# Patient Record
Sex: MALE | Race: WHITE | NOT HISPANIC OR LATINO | Employment: STUDENT | URBAN - METROPOLITAN AREA
[De-identification: names, ages, dates, MRNs, and addresses within clinical notes are randomized per-mention and may not be internally consistent; named-entity substitution may affect disease eponyms.]

---

## 2019-01-03 ENCOUNTER — LAB VISIT (OUTPATIENT)
Dept: LAB | Facility: HOSPITAL | Age: 19
End: 2019-01-03
Attending: INTERNAL MEDICINE
Payer: COMMERCIAL

## 2019-01-03 ENCOUNTER — OFFICE VISIT (OUTPATIENT)
Dept: PRIMARY CARE CLINIC | Facility: CLINIC | Age: 19
End: 2019-01-03
Payer: COMMERCIAL

## 2019-01-03 ENCOUNTER — PATIENT MESSAGE (OUTPATIENT)
Dept: PRIMARY CARE CLINIC | Facility: CLINIC | Age: 19
End: 2019-01-03

## 2019-01-03 VITALS
BODY MASS INDEX: 18.63 KG/M2 | WEIGHT: 133.06 LBS | SYSTOLIC BLOOD PRESSURE: 123 MMHG | HEIGHT: 71 IN | HEART RATE: 90 BPM | DIASTOLIC BLOOD PRESSURE: 73 MMHG | OXYGEN SATURATION: 99 %

## 2019-01-03 DIAGNOSIS — Z00.00 ANNUAL PHYSICAL EXAM: ICD-10-CM

## 2019-01-03 DIAGNOSIS — Z00.00 ANNUAL PHYSICAL EXAM: Primary | ICD-10-CM

## 2019-01-03 DIAGNOSIS — G47.9 SLEEP DISORDER: ICD-10-CM

## 2019-01-03 LAB
25(OH)D3+25(OH)D2 SERPL-MCNC: 28 NG/ML
ALBUMIN SERPL BCP-MCNC: 4.5 G/DL
ALP SERPL-CCNC: 67 U/L
ALT SERPL W/O P-5'-P-CCNC: 11 U/L
ANION GAP SERPL CALC-SCNC: 9 MMOL/L
AST SERPL-CCNC: 17 U/L
BASOPHILS # BLD AUTO: 0.03 K/UL
BASOPHILS NFR BLD: 0.5 %
BILIRUB SERPL-MCNC: 0.4 MG/DL
BUN SERPL-MCNC: 17 MG/DL
CALCIUM SERPL-MCNC: 10.2 MG/DL
CHLORIDE SERPL-SCNC: 104 MMOL/L
CHOLEST SERPL-MCNC: 192 MG/DL
CHOLEST/HDLC SERPL: 3.9 {RATIO}
CO2 SERPL-SCNC: 27 MMOL/L
CREAT SERPL-MCNC: 1.1 MG/DL
DIFFERENTIAL METHOD: NORMAL
EOSINOPHIL # BLD AUTO: 0.2 K/UL
EOSINOPHIL NFR BLD: 2.6 %
ERYTHROCYTE [DISTWIDTH] IN BLOOD BY AUTOMATED COUNT: 12.7 %
EST. GFR  (AFRICAN AMERICAN): >60 ML/MIN/1.73 M^2
EST. GFR  (NON AFRICAN AMERICAN): >60 ML/MIN/1.73 M^2
GLUCOSE SERPL-MCNC: 85 MG/DL
HCT VFR BLD AUTO: 45.8 %
HDLC SERPL-MCNC: 49 MG/DL
HDLC SERPL: 25.5 %
HGB BLD-MCNC: 14.9 G/DL
IMM GRANULOCYTES # BLD AUTO: 0.01 K/UL
IMM GRANULOCYTES NFR BLD AUTO: 0.2 %
LDLC SERPL CALC-MCNC: 109.4 MG/DL
LYMPHOCYTES # BLD AUTO: 1.4 K/UL
LYMPHOCYTES NFR BLD: 23.4 %
MCH RBC QN AUTO: 30.7 PG
MCHC RBC AUTO-ENTMCNC: 32.5 G/DL
MCV RBC AUTO: 94 FL
MONOCYTES # BLD AUTO: 0.5 K/UL
MONOCYTES NFR BLD: 8.3 %
NEUTROPHILS # BLD AUTO: 3.8 K/UL
NEUTROPHILS NFR BLD: 65 %
NONHDLC SERPL-MCNC: 143 MG/DL
NRBC BLD-RTO: 0 /100 WBC
PLATELET # BLD AUTO: 185 K/UL
PMV BLD AUTO: 11 FL
POTASSIUM SERPL-SCNC: 4.7 MMOL/L
PROT SERPL-MCNC: 8.3 G/DL
RBC # BLD AUTO: 4.86 M/UL
SODIUM SERPL-SCNC: 140 MMOL/L
TRIGL SERPL-MCNC: 168 MG/DL
TSH SERPL DL<=0.005 MIU/L-ACNC: 1.9 UIU/ML
WBC # BLD AUTO: 5.81 K/UL

## 2019-01-03 PROCEDURE — 80053 COMPREHEN METABOLIC PANEL: CPT

## 2019-01-03 PROCEDURE — 99203 PR OFFICE/OUTPT VISIT, NEW, LEVL III, 30-44 MIN: ICD-10-PCS | Mod: S$GLB,,, | Performed by: INTERNAL MEDICINE

## 2019-01-03 PROCEDURE — 99999 PR PBB SHADOW E&M-NEW PATIENT-LVL III: ICD-10-PCS | Mod: PBBFAC,,, | Performed by: INTERNAL MEDICINE

## 2019-01-03 PROCEDURE — 36415 COLL VENOUS BLD VENIPUNCTURE: CPT | Mod: PN

## 2019-01-03 PROCEDURE — 84443 ASSAY THYROID STIM HORMONE: CPT

## 2019-01-03 PROCEDURE — 82306 VITAMIN D 25 HYDROXY: CPT

## 2019-01-03 PROCEDURE — 99203 OFFICE O/P NEW LOW 30 MIN: CPT | Mod: S$GLB,,, | Performed by: INTERNAL MEDICINE

## 2019-01-03 PROCEDURE — 80061 LIPID PANEL: CPT

## 2019-01-03 PROCEDURE — 85025 COMPLETE CBC W/AUTO DIFF WBC: CPT

## 2019-01-03 PROCEDURE — 99999 PR PBB SHADOW E&M-NEW PATIENT-LVL III: CPT | Mod: PBBFAC,,, | Performed by: INTERNAL MEDICINE

## 2019-01-03 NOTE — PROGRESS NOTES
Subjective:       Patient ID: Caleb Mckenzie is a 18 y.o. male with no significant PMH who presents today to establish care.    Chief Complaint: Establish Care    HPI   Patient back from college and his mother wants his vitamin D checked as he is sleeping a lot.  Sleeping about 10 hours while at home.  At schedule 7-8 hours a night.  Patient denies f/c, n/v/d.  No chest pain or SOB.  No abdominal pain or dysuria.  No headaches or change in vision.  No dizziness.  No significant  weight gain or weight loss.  Remaining ROS negative.    Review of Systems   Constitutional: Positive for activity change. Negative for appetite change, diaphoresis, fatigue and unexpected weight change.   HENT: Negative for congestion, ear pain, hearing loss, rhinorrhea, sinus pressure, sore throat, trouble swallowing and voice change.    Eyes: Negative for photophobia, pain, discharge and visual disturbance.   Respiratory: Negative for cough, chest tightness, shortness of breath and wheezing.    Cardiovascular: Negative for chest pain, palpitations and leg swelling.   Gastrointestinal: Negative for abdominal pain, blood in stool, constipation, diarrhea, nausea and vomiting.   Endocrine: Negative for cold intolerance, heat intolerance, polydipsia, polyphagia and polyuria.   Genitourinary: Negative for decreased urine volume, difficulty urinating, discharge, dysuria, flank pain, hematuria, scrotal swelling, testicular pain and urgency.   Musculoskeletal: Negative for arthralgias, back pain, joint swelling, myalgias and neck pain.   Skin: Negative for rash.   Neurological: Negative for dizziness, tremors, syncope, weakness, numbness and headaches.   Hematological: Does not bruise/bleed easily.   Psychiatric/Behavioral: Positive for sleep disturbance. Negative for agitation, confusion and dysphoric mood. The patient is not nervous/anxious.        Objective:      Physical Exam   Constitutional: He is oriented to person, place, and time. He appears  well-developed and well-nourished.   HENT:   Head: Normocephalic.   Nose: Nose normal.   Mouth/Throat: Oropharynx is clear and moist.   Eyes: Conjunctivae and EOM are normal. Pupils are equal, round, and reactive to light. Right eye exhibits no discharge. Left eye exhibits no discharge. No scleral icterus.   Neck: Normal range of motion. Neck supple. No thyromegaly present.   Cardiovascular: Normal rate, regular rhythm, normal heart sounds and intact distal pulses. Exam reveals no gallop and no friction rub.   No murmur heard.  Pulmonary/Chest: Effort normal and breath sounds normal. No respiratory distress. He has no wheezes. He has no rales.   Abdominal: Soft. Bowel sounds are normal. He exhibits no distension. There is no tenderness. There is no rebound and no guarding.   Genitourinary:   Genitourinary Comments: deferred   Musculoskeletal: He exhibits no edema.   Lymphadenopathy:     He has no cervical adenopathy.   Neurological: He is alert and oriented to person, place, and time. No cranial nerve deficit or sensory deficit.   Skin: Skin is warm and dry. No rash noted. No erythema.   Psychiatric: He has a normal mood and affect. His behavior is normal. Thought content normal.       Assessment:       1. Annual physical exam    2. Sleep disorder        Plan:   -Today's Visit - patient is awake and alert.    -Psych - Sleep Disorder (excess sleep) - PHQ-9 of 7 points (Mild Depression).  Exam overall normal.  Will order labs today, including TSH and CBC.  Recommend to increase physical activity during the day.    -HCM - Discussed Flu (11/2018) and Tdap (7/2018) Vaccines.  We discussed STD screening (not sexually active except oral sex)  -declined.    -Follow Up - 1 year

## 2019-01-03 NOTE — PATIENT INSTRUCTIONS
For excess sleep, Your vital signs and exam were unremarkable.  Your PHQ-9 depression scale was mildly elevated.  I recommend to Increase exercise during the day.  I will order routine fasting labs today, including a Thyroid screen and Vitamin D as requested.    Return in 1 year- sooner if needed.  Please come at least 15-20 minutes before your scheduled appointment time.

## 2020-12-18 ENCOUNTER — LAB VISIT (OUTPATIENT)
Dept: PRIMARY CARE CLINIC | Facility: OTHER | Age: 20
End: 2020-12-18
Attending: INTERNAL MEDICINE
Payer: COMMERCIAL

## 2020-12-18 DIAGNOSIS — Z03.818 ENCOUNTER FOR OBSERVATION FOR SUSPECTED EXPOSURE TO OTHER BIOLOGICAL AGENTS RULED OUT: ICD-10-CM

## 2020-12-18 PROCEDURE — U0003 INFECTIOUS AGENT DETECTION BY NUCLEIC ACID (DNA OR RNA); SEVERE ACUTE RESPIRATORY SYNDROME CORONAVIRUS 2 (SARS-COV-2) (CORONAVIRUS DISEASE [COVID-19]), AMPLIFIED PROBE TECHNIQUE, MAKING USE OF HIGH THROUGHPUT TECHNOLOGIES AS DESCRIBED BY CMS-2020-01-R: HCPCS

## 2020-12-19 LAB — SARS-COV-2 RNA RESP QL NAA+PROBE: NOT DETECTED

## 2021-04-26 ENCOUNTER — PATIENT MESSAGE (OUTPATIENT)
Dept: RESEARCH | Facility: HOSPITAL | Age: 21
End: 2021-04-26